# Patient Record
Sex: FEMALE | URBAN - METROPOLITAN AREA
[De-identification: names, ages, dates, MRNs, and addresses within clinical notes are randomized per-mention and may not be internally consistent; named-entity substitution may affect disease eponyms.]

---

## 2021-07-20 ENCOUNTER — OFFICE VISIT (OUTPATIENT)
Dept: URBAN - METROPOLITAN AREA CLINIC 44 | Facility: CLINIC | Age: 66
End: 2021-07-20
Payer: COMMERCIAL

## 2021-07-20 PROCEDURE — 99204 OFFICE O/P NEW MOD 45 MIN: CPT | Performed by: OPTOMETRIST

## 2021-07-20 PROCEDURE — 92134 CPTRZ OPH DX IMG PST SGM RTA: CPT | Performed by: OPTOMETRIST

## 2021-07-20 ASSESSMENT — INTRAOCULAR PRESSURE
OD: 15
OS: 17

## 2021-07-20 ASSESSMENT — KERATOMETRY
OS: 45.00
OD: 43.63

## 2021-07-20 ASSESSMENT — VISUAL ACUITY
OD: 20/30
OS: 20/20

## 2021-07-20 NOTE — IMPRESSION/PLAN
Impression: Unspecified retinoschisis, right eye: H33.101. Patient asymptomatic Plan: PLAN: Discussed findings with patient. S/S of RD explained. Observe.  RTC 12 months for complete + OPTOS

## 2021-07-20 NOTE — IMPRESSION/PLAN
Impression: Combined forms of age-related cataract, bilateral: H25.813. Visually significant/symptomatic cataracts. BCVA OD 20/30, OS 20/20. Glare OD 20/80, OS 20/30. Plan: Discussed cataract findings and treatment options with patient. Rec CE/IOL to improve vision. Patient wishes to proceed with surgery. R/A/B to be discussed. OD first followed by OS. Patient is a candidate for Multifocal, toric, and standard IOL. Goal is plano and patient understand they will need glasses for near vision.  SX OS due to anisometropia

## 2021-07-20 NOTE — IMPRESSION/PLAN
Impression: Tear film insufficiency of bilateral lacrimal glands: H04.123. Condition mild. Patient reports no or occasional symptoms. Clinical evaluation shows mild DED. Plan: PLAN: Warm compresses for 10 minutes AM (Tate Mask or equal). Recommend Lipid based tears to be used 4X daily. RTC if symptom's worsen.

## 2021-08-12 ENCOUNTER — OFFICE VISIT (OUTPATIENT)
Dept: URBAN - METROPOLITAN AREA CLINIC 44 | Facility: CLINIC | Age: 66
End: 2021-08-12
Payer: COMMERCIAL

## 2021-08-12 DIAGNOSIS — H25.813 COMBINED FORMS OF AGE-RELATED CATARACT, BILATERAL: ICD-10-CM

## 2021-08-12 DIAGNOSIS — Z01.818 ENCOUNTER FOR OTHER PREPROCEDURAL EXAMINATION: Primary | ICD-10-CM

## 2021-08-12 PROCEDURE — 99203 OFFICE O/P NEW LOW 30 MIN: CPT | Performed by: PHYSICIAN ASSISTANT

## 2021-08-12 ASSESSMENT — PACHYMETRY
OD: 20.46
OS: 20.70
OD: 2.83
OS: 2.91

## 2021-08-16 ENCOUNTER — PRE-OPERATIVE VISIT (OUTPATIENT)
Dept: URBAN - METROPOLITAN AREA CLINIC 44 | Facility: CLINIC | Age: 66
End: 2021-08-16
Payer: COMMERCIAL

## 2021-08-16 DIAGNOSIS — H43.813 VITREOUS DEGENERATION, BILATERAL: ICD-10-CM

## 2021-08-16 DIAGNOSIS — H04.123 TEAR FILM INSUFFICIENCY OF BILATERAL LACRIMAL GLANDS: ICD-10-CM

## 2021-08-16 DIAGNOSIS — H50.05 ALTERNATING ESOTROPIA: ICD-10-CM

## 2021-08-16 DIAGNOSIS — H33.101 UNSPECIFIED RETINOSCHISIS, RIGHT EYE: ICD-10-CM

## 2021-08-16 DIAGNOSIS — H25.812 COMBINED FORMS OF AGE-RELATED CATARACT, LEFT EYE: ICD-10-CM

## 2021-08-16 PROCEDURE — 92136 OPHTHALMIC BIOMETRY: CPT | Performed by: OPHTHALMOLOGY

## 2021-08-16 PROCEDURE — 99204 OFFICE O/P NEW MOD 45 MIN: CPT | Performed by: OPHTHALMOLOGY

## 2021-08-16 ASSESSMENT — INTRAOCULAR PRESSURE
OD: 16
OS: 16

## 2021-08-16 ASSESSMENT — PACHYMETRY
OD: 2.83
OS: 20.70
OS: 2.91
OD: 20.46

## 2021-08-16 NOTE — IMPRESSION/PLAN
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 Plan: hx of lazy eye.   Discussed with patient will limit the vision post cataract surgery

## 2021-08-16 NOTE — IMPRESSION/PLAN
Impression: Vitreous degeneration, bilateral: H43.813. Plan: Discussed rt/rd precaution. Advised patient to call us if patient has onset of new floaters and flashes of light. Monitor. Patient to call us sooner PRN onset of new visual changes.   Discussed with patient will limit the vision post cataract surgery

## 2021-08-16 NOTE — IMPRESSION/PLAN
Impression: Unspecified retinoschisis, right eye: H33.101. Patient asymptomatic Plan: continue to monitor.   Discussed with patient will limit the vision post cataract surgery

## 2021-08-16 NOTE — IMPRESSION/PLAN
Impression: Combined forms of age-related cataract, bilateral: H25.813. Plan: Discussed cataract diagnosis with the patient. Cataracts are limiting vision. Discussed risks, benefits and alternatives to surgery including but not limited to: bleeding, infection, risk of vision loss, loss of the eye, need for other surgery. Patient voiced understanding and wishes to proceed. Patient elects surgical treatment. Specialty lens options discussed and pt declines. Patient desires surgery OU, OD  first, Standard IOL  (( AIM : - 0.25 OU, DEXYCU OU, no LENSX, ORA OU, no LRI OU, AMP, higher risk d/t shorter eye )) Patient understands the need for glasses after surgery for BCVA.

## 2021-08-16 NOTE — IMPRESSION/PLAN
Impression: Tear film insufficiency of bilateral lacrimal glands: H04.123. Plan: Dry eyes account for the patient's complaints. There is no evidence of permanent changes to the cornea. Explained condition does not have a cure and will need artificial tears for maintenance. Advised patient to use AT 2-3 times daily.   Discussed with patient will limit the vision post cataract surgery

## 2021-08-25 ENCOUNTER — SURGERY (OUTPATIENT)
Dept: URBAN - METROPOLITAN AREA SURGERY 19 | Facility: SURGERY | Age: 66
End: 2021-08-25
Payer: COMMERCIAL

## 2021-08-25 PROCEDURE — 66984 XCAPSL CTRC RMVL W/O ECP: CPT | Performed by: OPHTHALMOLOGY

## 2021-08-26 ENCOUNTER — POST-OPERATIVE VISIT (OUTPATIENT)
Dept: URBAN - METROPOLITAN AREA CLINIC 44 | Facility: CLINIC | Age: 66
End: 2021-08-26

## 2021-08-26 PROCEDURE — 99024 POSTOP FOLLOW-UP VISIT: CPT | Performed by: OPTOMETRIST

## 2021-08-26 ASSESSMENT — INTRAOCULAR PRESSURE: OD: 22

## 2021-08-26 NOTE — IMPRESSION/PLAN
Impression: S/P Cataract Extraction by phacoemulsification with IOL placement; ORA OD - 1 Day. Encounter for surgical aftercare following surgery on a sense organ  Z48.140. Plan: Reviewed findings with patient. Continue with prescribed medications as directed. RTC 1 week for Refraction + IOP check.  . RTC sooner if any decreased vision or pain occurs

## 2021-09-02 ENCOUNTER — POST-OPERATIVE VISIT (OUTPATIENT)
Dept: URBAN - METROPOLITAN AREA CLINIC 44 | Facility: CLINIC | Age: 66
End: 2021-09-02

## 2021-09-02 DIAGNOSIS — Z48.810 ENCOUNTER FOR SURGICAL AFTERCARE FOLLOWING SURGERY ON A SENSE ORGAN: Primary | ICD-10-CM

## 2021-09-02 PROCEDURE — 99024 POSTOP FOLLOW-UP VISIT: CPT | Performed by: OPTOMETRIST

## 2021-09-02 ASSESSMENT — VISUAL ACUITY
OD: 20/25
OS: 20/25

## 2021-09-02 ASSESSMENT — INTRAOCULAR PRESSURE
OS: 19
OD: 16

## 2021-09-08 ENCOUNTER — SURGERY (OUTPATIENT)
Dept: URBAN - METROPOLITAN AREA SURGERY 19 | Facility: SURGERY | Age: 66
End: 2021-09-08
Payer: COMMERCIAL

## 2021-09-08 PROCEDURE — 66984 XCAPSL CTRC RMVL W/O ECP: CPT | Performed by: OPHTHALMOLOGY

## 2021-09-09 ENCOUNTER — POST-OPERATIVE VISIT (OUTPATIENT)
Dept: URBAN - METROPOLITAN AREA CLINIC 44 | Facility: CLINIC | Age: 66
End: 2021-09-09

## 2021-09-09 DIAGNOSIS — Z96.1 PRESENCE OF INTRAOCULAR LENS: Primary | ICD-10-CM

## 2021-09-09 PROCEDURE — 99024 POSTOP FOLLOW-UP VISIT: CPT | Performed by: OPTOMETRIST

## 2021-09-09 NOTE — IMPRESSION/PLAN
Impression: S/P Cataract Extraction by phacoemulsification with IOL placement; ORA OS - 1 Day. Presence of intraocular lens  Z96.1. ED 95% resolved Plan: Reviewed post-op progress. Removed BCL OS today. AT [facility]. Continue with prescribed medications as directed. RTC 3-5 weeks for final post-op visit and refraction. RTC sooner if decreased in vision or pain experienced.

## 2021-10-01 ENCOUNTER — POST-OPERATIVE VISIT (OUTPATIENT)
Dept: URBAN - METROPOLITAN AREA CLINIC 44 | Facility: CLINIC | Age: 66
End: 2021-10-01

## 2021-10-01 PROCEDURE — 99024 POSTOP FOLLOW-UP VISIT: CPT | Performed by: OPTOMETRIST

## 2021-10-01 ASSESSMENT — INTRAOCULAR PRESSURE
OS: 13
OD: 13

## 2021-10-01 ASSESSMENT — VISUAL ACUITY
OS: 20/25
OD: 20/30

## 2021-10-01 NOTE — IMPRESSION/PLAN
Impression: S/P Cataract Extraction by phacoemulsification with IOL placement; ORA OS - 23 Days. Presence of intraocular lens  Z96.1. Plan: RX deferred. OCT PRN.  RTC 10 -12 months for complete

## 2022-06-12 NOTE — IMPRESSION/PLAN
Impression: S/P Cataract Extraction by phacoemulsification with IOL placement; ORA OD - 8 Days. Encounter for surgical aftercare following surgery on a sense organ  Z48.810. Plan: Reviewed findings with patient. Continue with prescribed medications as directed. RTC as scheduled for CE/IOL 2nd eye and then 1 day S/P CE/IOL.
nonobstructive CAD

## 2022-09-29 ENCOUNTER — OFFICE VISIT (OUTPATIENT)
Dept: URBAN - METROPOLITAN AREA CLINIC 44 | Facility: CLINIC | Age: 67
End: 2022-09-29
Payer: COMMERCIAL

## 2022-09-29 DIAGNOSIS — H26.493 OTHER SECONDARY CATARACT, BILATERAL: ICD-10-CM

## 2022-09-29 DIAGNOSIS — H43.813 VITREOUS DEGENERATION, BILATERAL: ICD-10-CM

## 2022-09-29 DIAGNOSIS — H04.123 TEAR FILM INSUFFICIENCY OF BILATERAL LACRIMAL GLANDS: ICD-10-CM

## 2022-09-29 DIAGNOSIS — H33.101 UNSPECIFIED RETINOSCHISIS, RIGHT EYE: Primary | ICD-10-CM

## 2022-09-29 DIAGNOSIS — H02.413 MECHANICAL PTOSOS OF BILATERAL EYELIDS: ICD-10-CM

## 2022-09-29 PROCEDURE — 92014 COMPRE OPH EXAM EST PT 1/>: CPT | Performed by: OPTOMETRIST

## 2022-09-29 ASSESSMENT — KERATOMETRY
OS: 44.63
OD: 43.75

## 2022-09-29 ASSESSMENT — VISUAL ACUITY
OD: 20/30
OS: 20/25

## 2022-09-29 ASSESSMENT — INTRAOCULAR PRESSURE
OD: 14
OS: 15

## 2022-09-29 NOTE — IMPRESSION/PLAN
Impression: Unspecified retinoschisis, right eye: H33.101. Patient asymptomatic Plan: continue to monitor.   RTC 12 months for complete + OPTOs

## 2022-09-29 NOTE — IMPRESSION/PLAN
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 Plan: PLAN: Discussed options with patient. Patient wishes to observe at this time.

## 2022-09-29 NOTE — IMPRESSION/PLAN
Impression: Other secondary cataract, bilateral: H26.493. Visually non significant/symptomatic PCO. Plan: PLAN: Discussed findings. No visual complaints at this time.  Observe and RTC 12 months for complete

## 2022-09-29 NOTE — IMPRESSION/PLAN
Impression: Vitreous degeneration, bilateral: H43.813. Plan: PLAN: Discussed S/S of RD/RT. Stressed importance to RTC immediately if S/S progress.